# Patient Record
Sex: FEMALE | Race: WHITE | URBAN - METROPOLITAN AREA
[De-identification: names, ages, dates, MRNs, and addresses within clinical notes are randomized per-mention and may not be internally consistent; named-entity substitution may affect disease eponyms.]

---

## 2024-03-04 ENCOUNTER — APPOINTMENT (RX ONLY)
Dept: URBAN - METROPOLITAN AREA CLINIC 44 | Facility: CLINIC | Age: 74
Setting detail: DERMATOLOGY
End: 2024-03-04

## 2024-03-04 DIAGNOSIS — L82.0 INFLAMED SEBORRHEIC KERATOSIS: ICD-10-CM

## 2024-03-04 DIAGNOSIS — L82.1 OTHER SEBORRHEIC KERATOSIS: ICD-10-CM | Status: INADEQUATELY CONTROLLED

## 2024-03-04 PROCEDURE — ? FULL BODY SKIN EXAM - DECLINED

## 2024-03-04 PROCEDURE — ? ADDITIONAL NOTES

## 2024-03-04 PROCEDURE — 99203 OFFICE O/P NEW LOW 30 MIN: CPT

## 2024-03-04 PROCEDURE — ? DEFER

## 2024-03-04 PROCEDURE — ? COUNSELING

## 2024-03-04 ASSESSMENT — LOCATION DETAILED DESCRIPTION DERM
LOCATION DETAILED: RIGHT RIB CAGE
LOCATION DETAILED: LEFT MEDIAL UPPER BACK
LOCATION DETAILED: RIGHT INFERIOR UPPER BACK
LOCATION DETAILED: LEFT RIB CAGE
LOCATION DETAILED: LEFT MEDIAL BREAST 9-10:00 REGION
LOCATION DETAILED: RIGHT LATERAL UPPER BACK
LOCATION DETAILED: LEFT MEDIAL TRAPEZIAL NECK
LOCATION DETAILED: LEFT SUPERIOR MEDIAL MIDBACK

## 2024-03-04 ASSESSMENT — LOCATION SIMPLE DESCRIPTION DERM
LOCATION SIMPLE: LEFT BREAST
LOCATION SIMPLE: LEFT UPPER BACK
LOCATION SIMPLE: RIGHT UPPER BACK
LOCATION SIMPLE: ABDOMEN
LOCATION SIMPLE: POSTERIOR NECK
LOCATION SIMPLE: LEFT LOWER BACK

## 2024-03-04 ASSESSMENT — LOCATION ZONE DERM
LOCATION ZONE: TRUNK
LOCATION ZONE: NECK

## 2024-03-04 NOTE — PROCEDURE: DEFER
Other Procedure: and some LN2
Procedure To Be Performed At Next Visit: Biopsy by shave method
X Size Of Lesion In Cm (Optional): 0
Detail Level: Detailed
Introduction Text (Please End With A Colon): The following procedure was ln2

## 2024-03-04 NOTE — PROCEDURE: ADDITIONAL NOTES
Additional Notes: Tangential biopsy x 3  and ( codes [id number]). Discussed shaving and freezing
Render Risk Assessment In Note?: no
Detail Level: Zone

## 2024-03-04 NOTE — HPI: SKIN LESION
What Type Of Note Output Would You Prefer (Optional)?: Bullet Format
How Severe Is Your Skin Lesion?: mild
Has Your Skin Lesion Been Treated?: not been treated
Is This A New Presentation, Or A Follow-Up?: Skin Lesions
Additional History: New patient. \\n\\nPt presents for spot check on abdomen and back, pt declined fbse. \\n\\nPt states she noticed growths and would like to make sure they’re ok and if they can be removed.